# Patient Record
Sex: MALE | Race: WHITE | NOT HISPANIC OR LATINO | URBAN - METROPOLITAN AREA
[De-identification: names, ages, dates, MRNs, and addresses within clinical notes are randomized per-mention and may not be internally consistent; named-entity substitution may affect disease eponyms.]

---

## 2020-01-06 ENCOUNTER — INPATIENT (INPATIENT)
Facility: HOSPITAL | Age: 45
LOS: 0 days | Discharge: HOME | DRG: 866 | End: 2020-01-07
Attending: INTERNAL MEDICINE | Admitting: INTERNAL MEDICINE
Payer: COMMERCIAL

## 2020-01-06 VITALS
SYSTOLIC BLOOD PRESSURE: 145 MMHG | RESPIRATION RATE: 16 BRPM | OXYGEN SATURATION: 98 % | DIASTOLIC BLOOD PRESSURE: 86 MMHG | WEIGHT: 164.91 LBS | TEMPERATURE: 99 F | HEIGHT: 66 IN | HEART RATE: 104 BPM

## 2020-01-06 DIAGNOSIS — R07.9 CHEST PAIN, UNSPECIFIED: ICD-10-CM

## 2020-01-06 LAB
ALBUMIN SERPL ELPH-MCNC: 4.2 G/DL — SIGNIFICANT CHANGE UP (ref 3.3–5)
ALP SERPL-CCNC: 81 U/L — SIGNIFICANT CHANGE UP (ref 40–120)
ALT FLD-CCNC: 51 U/L — HIGH (ref 10–45)
ANION GAP SERPL CALC-SCNC: 14 MMOL/L — SIGNIFICANT CHANGE UP (ref 5–17)
APPEARANCE UR: CLEAR — SIGNIFICANT CHANGE UP
APTT BLD: 32.2 SEC — SIGNIFICANT CHANGE UP (ref 27.5–36.3)
AST SERPL-CCNC: 33 U/L — SIGNIFICANT CHANGE UP (ref 10–40)
BASOPHILS # BLD AUTO: 0.03 K/UL — SIGNIFICANT CHANGE UP (ref 0–0.2)
BASOPHILS NFR BLD AUTO: 0.3 % — SIGNIFICANT CHANGE UP (ref 0–2)
BILIRUB SERPL-MCNC: 0.7 MG/DL — SIGNIFICANT CHANGE UP (ref 0.2–1.2)
BILIRUB UR-MCNC: NEGATIVE — SIGNIFICANT CHANGE UP
BUN SERPL-MCNC: 13 MG/DL — SIGNIFICANT CHANGE UP (ref 7–23)
CALCIUM SERPL-MCNC: 8.6 MG/DL — SIGNIFICANT CHANGE UP (ref 8.4–10.5)
CHLORIDE SERPL-SCNC: 101 MMOL/L — SIGNIFICANT CHANGE UP (ref 96–108)
CK MB BLD-MCNC: 3.9 % — HIGH (ref 0–3.5)
CK MB BLD-MCNC: 7.8 % — HIGH (ref 0–3.5)
CK MB CFR SERPL CALC: 13.5 NG/ML — HIGH (ref 0–6.7)
CK MB CFR SERPL CALC: 4 NG/ML — SIGNIFICANT CHANGE UP (ref 0–6.7)
CK SERPL-CCNC: 103 U/L — SIGNIFICANT CHANGE UP (ref 30–200)
CK SERPL-CCNC: 174 U/L — SIGNIFICANT CHANGE UP (ref 30–200)
CO2 SERPL-SCNC: 22 MMOL/L — SIGNIFICANT CHANGE UP (ref 22–31)
COLOR SPEC: YELLOW — SIGNIFICANT CHANGE UP
CREAT SERPL-MCNC: 1.14 MG/DL — SIGNIFICANT CHANGE UP (ref 0.5–1.3)
DIFF PNL FLD: NEGATIVE — SIGNIFICANT CHANGE UP
EOSINOPHIL # BLD AUTO: 0.05 K/UL — SIGNIFICANT CHANGE UP (ref 0–0.5)
EOSINOPHIL NFR BLD AUTO: 0.4 % — SIGNIFICANT CHANGE UP (ref 0–6)
ERYTHROCYTE [SEDIMENTATION RATE] IN BLOOD: 33 MM/HR — HIGH (ref 0–15)
GLUCOSE SERPL-MCNC: 112 MG/DL — HIGH (ref 70–99)
GLUCOSE UR QL: NEGATIVE — SIGNIFICANT CHANGE UP
HBA1C BLD-MCNC: 5.2 % — SIGNIFICANT CHANGE UP (ref 4–5.6)
HCT VFR BLD CALC: 45.6 % — SIGNIFICANT CHANGE UP (ref 39–50)
HGB BLD-MCNC: 15.3 G/DL — SIGNIFICANT CHANGE UP (ref 13–17)
IMM GRANULOCYTES NFR BLD AUTO: 0.4 % — SIGNIFICANT CHANGE UP (ref 0–1.5)
INR BLD: 1.18 RATIO — HIGH (ref 0.88–1.16)
KETONES UR-MCNC: NEGATIVE — SIGNIFICANT CHANGE UP
LACTATE BLDV-MCNC: 1.2 MMOL/L — SIGNIFICANT CHANGE UP (ref 0.7–2)
LEUKOCYTE ESTERASE UR-ACNC: NEGATIVE — SIGNIFICANT CHANGE UP
LYMPHOCYTES # BLD AUTO: 1.8 K/UL — SIGNIFICANT CHANGE UP (ref 1–3.3)
LYMPHOCYTES # BLD AUTO: 15.9 % — SIGNIFICANT CHANGE UP (ref 13–44)
MCHC RBC-ENTMCNC: 31.6 PG — SIGNIFICANT CHANGE UP (ref 27–34)
MCHC RBC-ENTMCNC: 33.6 GM/DL — SIGNIFICANT CHANGE UP (ref 32–36)
MCV RBC AUTO: 94.2 FL — SIGNIFICANT CHANGE UP (ref 80–100)
MONOCYTES # BLD AUTO: 1.39 K/UL — HIGH (ref 0–0.9)
MONOCYTES NFR BLD AUTO: 12.2 % — SIGNIFICANT CHANGE UP (ref 2–14)
NEUTROPHILS # BLD AUTO: 8.03 K/UL — HIGH (ref 1.8–7.4)
NEUTROPHILS NFR BLD AUTO: 70.8 % — SIGNIFICANT CHANGE UP (ref 43–77)
NITRITE UR-MCNC: NEGATIVE — SIGNIFICANT CHANGE UP
NRBC # BLD: 0 /100 WBCS — SIGNIFICANT CHANGE UP (ref 0–0)
PH UR: 7 — SIGNIFICANT CHANGE UP (ref 5–8)
PLATELET # BLD AUTO: 172 K/UL — SIGNIFICANT CHANGE UP (ref 150–400)
POTASSIUM SERPL-MCNC: 3.9 MMOL/L — SIGNIFICANT CHANGE UP (ref 3.5–5.3)
POTASSIUM SERPL-SCNC: 3.9 MMOL/L — SIGNIFICANT CHANGE UP (ref 3.5–5.3)
PROT SERPL-MCNC: 6.8 G/DL — SIGNIFICANT CHANGE UP (ref 6–8.3)
PROT UR-MCNC: ABNORMAL
PROTHROM AB SERPL-ACNC: 13.5 SEC — HIGH (ref 10–12.9)
RAPID RVP RESULT: SIGNIFICANT CHANGE UP
RBC # BLD: 4.84 M/UL — SIGNIFICANT CHANGE UP (ref 4.2–5.8)
RBC # FLD: 11.6 % — SIGNIFICANT CHANGE UP (ref 10.3–14.5)
SODIUM SERPL-SCNC: 137 MMOL/L — SIGNIFICANT CHANGE UP (ref 135–145)
SP GR SPEC: 1.03 — HIGH (ref 1.01–1.02)
TROPONIN T, HIGH SENSITIVITY RESULT: 218 NG/L — HIGH (ref 0–51)
TROPONIN T, HIGH SENSITIVITY RESULT: 26 NG/L — SIGNIFICANT CHANGE UP (ref 0–51)
TROPONIN T, HIGH SENSITIVITY RESULT: 37 NG/L — SIGNIFICANT CHANGE UP (ref 0–51)
TROPONIN T, HIGH SENSITIVITY RESULT: 64 NG/L — HIGH (ref 0–51)
TSH SERPL-MCNC: 2.79 UIU/ML — SIGNIFICANT CHANGE UP (ref 0.27–4.2)
UROBILINOGEN FLD QL: ABNORMAL
WBC # BLD: 11.35 K/UL — HIGH (ref 3.8–10.5)
WBC # FLD AUTO: 11.35 K/UL — HIGH (ref 3.8–10.5)

## 2020-01-06 PROCEDURE — 99285 EMERGENCY DEPT VISIT HI MDM: CPT

## 2020-01-06 PROCEDURE — 71046 X-RAY EXAM CHEST 2 VIEWS: CPT | Mod: 26

## 2020-01-06 PROCEDURE — 93010 ELECTROCARDIOGRAM REPORT: CPT

## 2020-01-06 PROCEDURE — 93306 TTE W/DOPPLER COMPLETE: CPT | Mod: 26

## 2020-01-06 PROCEDURE — 99221 1ST HOSP IP/OBS SF/LOW 40: CPT

## 2020-01-06 RX ORDER — ASPIRIN/CALCIUM CARB/MAGNESIUM 324 MG
324 TABLET ORAL ONCE
Refills: 0 | Status: COMPLETED | OUTPATIENT
Start: 2020-01-06 | End: 2020-01-06

## 2020-01-06 RX ORDER — SODIUM CHLORIDE 9 MG/ML
500 INJECTION, SOLUTION INTRAVENOUS ONCE
Refills: 0 | Status: COMPLETED | OUTPATIENT
Start: 2020-01-06 | End: 2020-01-06

## 2020-01-06 RX ORDER — TICAGRELOR 90 MG/1
180 TABLET ORAL ONCE
Refills: 0 | Status: COMPLETED | OUTPATIENT
Start: 2020-01-06 | End: 2020-01-06

## 2020-01-06 RX ORDER — HEPARIN SODIUM 5000 [USP'U]/ML
5000 INJECTION INTRAVENOUS; SUBCUTANEOUS EVERY 12 HOURS
Refills: 0 | Status: DISCONTINUED | OUTPATIENT
Start: 2020-01-06 | End: 2020-01-06

## 2020-01-06 RX ORDER — SODIUM CHLORIDE 9 MG/ML
1000 INJECTION INTRAMUSCULAR; INTRAVENOUS; SUBCUTANEOUS
Refills: 0 | Status: DISCONTINUED | OUTPATIENT
Start: 2020-01-06 | End: 2020-01-07

## 2020-01-06 RX ORDER — ASPIRIN/CALCIUM CARB/MAGNESIUM 324 MG
81 TABLET ORAL DAILY
Refills: 0 | Status: DISCONTINUED | OUTPATIENT
Start: 2020-01-06 | End: 2020-01-07

## 2020-01-06 RX ORDER — HEPARIN SODIUM 5000 [USP'U]/ML
4400 INJECTION INTRAVENOUS; SUBCUTANEOUS EVERY 6 HOURS
Refills: 0 | Status: DISCONTINUED | OUTPATIENT
Start: 2020-01-06 | End: 2020-01-07

## 2020-01-06 RX ORDER — ACETAMINOPHEN 500 MG
650 TABLET ORAL ONCE
Refills: 0 | Status: COMPLETED | OUTPATIENT
Start: 2020-01-06 | End: 2020-01-06

## 2020-01-06 RX ORDER — HEPARIN SODIUM 5000 [USP'U]/ML
INJECTION INTRAVENOUS; SUBCUTANEOUS
Qty: 25000 | Refills: 0 | Status: DISCONTINUED | OUTPATIENT
Start: 2020-01-06 | End: 2020-01-07

## 2020-01-06 RX ORDER — TICAGRELOR 90 MG/1
90 TABLET ORAL EVERY 12 HOURS
Refills: 0 | Status: DISCONTINUED | OUTPATIENT
Start: 2020-01-07 | End: 2020-01-07

## 2020-01-06 RX ORDER — IBUPROFEN 200 MG
400 TABLET ORAL ONCE
Refills: 0 | Status: COMPLETED | OUTPATIENT
Start: 2020-01-06 | End: 2020-01-06

## 2020-01-06 RX ADMIN — SODIUM CHLORIDE 75 MILLILITER(S): 9 INJECTION INTRAMUSCULAR; INTRAVENOUS; SUBCUTANEOUS at 20:35

## 2020-01-06 RX ADMIN — Medication 400 MILLIGRAM(S): at 02:14

## 2020-01-06 RX ADMIN — Medication 650 MILLIGRAM(S): at 02:14

## 2020-01-06 RX ADMIN — SODIUM CHLORIDE 500 MILLILITER(S): 9 INJECTION, SOLUTION INTRAVENOUS at 02:44

## 2020-01-06 RX ADMIN — Medication 324 MILLIGRAM(S): at 06:52

## 2020-01-06 RX ADMIN — SODIUM CHLORIDE 500 MILLILITER(S): 9 INJECTION, SOLUTION INTRAVENOUS at 03:36

## 2020-01-06 RX ADMIN — Medication 650 MILLIGRAM(S): at 21:00

## 2020-01-06 RX ADMIN — HEPARIN SODIUM 5000 UNIT(S): 5000 INJECTION INTRAVENOUS; SUBCUTANEOUS at 20:36

## 2020-01-06 RX ADMIN — Medication 400 MILLIGRAM(S): at 03:36

## 2020-01-06 RX ADMIN — Medication 650 MILLIGRAM(S): at 20:35

## 2020-01-06 RX ADMIN — SODIUM CHLORIDE 75 MILLILITER(S): 9 INJECTION INTRAMUSCULAR; INTRAVENOUS; SUBCUTANEOUS at 16:00

## 2020-01-06 RX ADMIN — TICAGRELOR 180 MILLIGRAM(S): 90 TABLET ORAL at 22:34

## 2020-01-06 RX ADMIN — Medication 650 MILLIGRAM(S): at 03:36

## 2020-01-06 RX ADMIN — HEPARIN SODIUM 900 UNIT(S)/HR: 5000 INJECTION INTRAVENOUS; SUBCUTANEOUS at 22:36

## 2020-01-06 RX ADMIN — Medication 81 MILLIGRAM(S): at 18:00

## 2020-01-06 NOTE — ED PROVIDER NOTE - CLINICAL SUMMARY MEDICAL DECISION MAKING FREE TEXT BOX
44M no PMH p/w chest pain. Will r/o ACS with trops, however based on history may be pericarditis vs PNA.

## 2020-01-06 NOTE — ED ADULT NURSE NOTE - OBJECTIVE STATEMENT
Pt is 44 Y A&O x3 M with no PMH presenting to ED with c/o constant "dull" midsternal chest pain associated with Pt is 44 Y A&O x3 M with no PMH presenting to ED with c/o constant "dull" midsternal chest pain radiating to back associated with SOB worse upon inspiration and laying down since yesterday afternoon. Pt reports N/V/D x1 yesterday. Pt denies N/V/D at this time, fevers, chills, cough, nasal congestion, burning upon urination. Pt arrives to ED breathing unlabored on RA. Skin is warm and dry. No diaphoresis noted. No edema to LE b/l noted. Pt febrile orally. Pt tachycardic to 102. Cardiac monitor in place. Safety and comfort maintained.

## 2020-01-06 NOTE — ED PROVIDER NOTE - ATTENDING CONTRIBUTION TO CARE
44M w/ no reported PMHx with moderate-severe midsternal chest pain, non-radiating, positional, exacerbated by lying back, alleviated by leaning forward, states it started yesterday, has not taken anything for it at home. Worsening. Reports + sick contacts, kids at home sick. Reports 1 episode of vomiting non-bloody, non-bilious, and one episode of non-bloody diarrhea yesterday. Denies any prior similar chest pain. Reports family history of diabetes and father who  of MI.    PHYSICAL EXAMINATION:  VITALS REVIEWED.  VS Febrile, tachycardic, slightly tachypneic @ 22, otherwise normal  GENERALIZED APPEARANCE:  Comfortable, mild acute distress, ambulating without difficulty  SKIN:  Warm, dry, no cyanosis  HEAD:  No obvious scalp lesions  EYES:  Conjunctiva pink, no icterus  ENMT:  Mucus membranes moist, no stridor  NECK:  Supple, non-tender  CHEST AND RESPIRATORY:  Clear to auscultation B/L, good air entry B/L, equal chest expansion  HEART AND CARDIOVASCULAR:  Regular rate, no obvious murmur  ABDOMEN AND GI:  Soft, non-tender, non-distended.  No rebound, no guarding, no CVA-area tenderness  EXTREMITIES:  No deformity, edema, or calf tenderness  NEURO: AAOx3, gross motor and sensory intact  PSYCH: Normal affect     Impression:  Chest pain, r/o ACS, r/o PNA, ?ANGELIA, ?pericarditis, r/o myocarditis, r/o electrolyte abnormalities  Labs, imaging, troponins, pain management, anti-pyretics, re-eval 44M w/ no reported PMHx with moderate-severe midsternal chest pain, non-radiating, positional, exacerbated by lying back, alleviated by leaning forward, states it started yesterday, has not taken anything for it at home. Worsening. Reports + sick contacts, kids at home sick. Reports 1 episode of vomiting non-bloody, non-bilious, and one episode of non-bloody diarrhea yesterday. Denies any prior similar chest pain. Reports family history of diabetes and father who  of MI.    PHYSICAL EXAMINATION:  VITALS REVIEWED.  VS Febrile, tachycardic, slightly tachypneic @ 22, otherwise normal  GENERALIZED APPEARANCE:  Comfortable, mild acute distress, ambulating without difficulty  SKIN:  Warm, dry, no cyanosis  HEAD:  No obvious scalp lesions  EYES:  Conjunctiva pink, no icterus  ENMT:  Mucus membranes moist, no stridor  NECK:  Supple, non-tender  CHEST AND RESPIRATORY:  Clear to auscultation B/L, good air entry B/L, equal chest expansion  HEART AND CARDIOVASCULAR:  Regular rate, no obvious murmur  ABDOMEN AND GI:  Soft, non-tender, non-distended.  No rebound, no guarding, no CVA-area tenderness  EXTREMITIES:  No deformity, edema, or calf tenderness  NEURO: AAOx3, gross motor and sensory intact  PSYCH: Normal affect     Impression:  Chest pain, r/o ACS, r/o PNA, ?ANGELIA, ?pericarditis, r/o myocarditis, r/o electrolyte abnormalities  Labs, imaging, troponins, pain management, anti-pyretics, re-eval; patient is SIRS criteria but would not consider sepsis given likely viral infection vs. other cardiac etiology, doubt bacterial infection, will avoid antibiotics and unnecessary fluid administration.

## 2020-01-06 NOTE — PROGRESS NOTE ADULT - SUBJECTIVE AND OBJECTIVE BOX
called by cssu team   patient with repeat HS trop increased to 218 from 64  remains completely asymptomatic without chest pain, dyspnea, chf  echo with nl lv function, no pericardial effusion of segmental wall motion abnl   chest pain atypical, likely related to viral prodrome/viral illness with fever, n/v, pleurtic cp, c/c rene/myocarditis  cont to monitor  trend trop to peak   in light of significantly elevated trop with sig delta, will plan for ischemic eval and definitive coronary assessment with CT coronaries moris  cont asa  d/w cssu team

## 2020-01-06 NOTE — ED PROVIDER NOTE - OBJECTIVE STATEMENT
44M no PMH p/w chest pain. 10/10, constricting pain, alleviated with leaning forward. Started Sunday afternoon and worsened throughout the day. associated with shortness of breath. +fevers. Yesterday morning pt reports he felt very sick and had one episode of nausea/vomiting/diarrhea

## 2020-01-06 NOTE — H&P ADULT - NSHPLABSRESULTS_GEN_ALL_CORE
15.3   11.35 )-----------( 172      ( 2020 02:19 )             45.6       01-06    137  |  101  |  13  ----------------------------<  112<H>  3.9   |  22  |  1.14    Ca    8.6      2020 02:19    TPro  6.8  /  Alb  4.2  /  TBili  0.7  /  DBili  x   /  AST  33  /  ALT  51<H>  /  AlkPhos  81  01-06              Urinalysis Basic - ( 2020 11:34 )    Color: Yellow / Appearance: Clear / S.031 / pH: x  Gluc: x / Ketone: Negative  / Bili: Negative / Urobili: 4 mg/dL   Blood: x / Protein: Trace / Nitrite: Negative   Leuk Esterase: Negative / RBC: 10 /hpf / WBC 0 /HPF   Sq Epi: x / Non Sq Epi: 0 /hpf / Bacteria: Negative        PT/INR - ( 2020 02:19 )   PT: 13.5 sec;   INR: 1.18 ratio         PTT - ( 2020 02:19 )  PTT:32.2 sec    Lactate Trend      CARDIAC MARKERS ( 2020 11:31 )  x     / x     / 103 U/L / x     / 4.0 ng/mL        CAPILLARY BLOOD GLUCOSE

## 2020-01-06 NOTE — CONSULT NOTE ADULT - SUBJECTIVE AND OBJECTIVE BOX
CARDIOLOGY CONSULT - Dr. Cisse         HPI:  44M no PMH p/w chest pain.  He reports on saturday he had n/V/ diarrhea. On sunday he experience 10/10,  mid chest pain, worse when laying flat, alleviated with leaning forward. Started Sunday afternoon and worsened throughout the day and associated with shortness of breath. +fevers. he reports son recently sick with pna. prior to this episode denies any exertional chest pain, palps, dizziness or orthopnea. + active. Denies hx of MI, cad, chf or arrhtymias. Never had cardiac eval.   Symptoms now resolved after taking tylenol and ibuprofen  in nad   ros otherwise negative       PAST MEDICAL & SURGICAL HISTORY:  No pertinent past medical history  No significant past surgical history          PREVIOUS DIAGNOSTIC TESTING:    [ ] Echocardiogram:  [ ]  Catheterization:  [ ] Stress Test:  	    MEDICATIONS:  Home Medications:      MEDICATIONS  (STANDING):  aspirin enteric coated 81 milliGRAM(s) Oral daily  heparin  Injectable 5000 Unit(s) SubCutaneous every 12 hours  sodium chloride 0.9%. 1000 milliLiter(s) (75 mL/Hr) IV Continuous <Continuous>      FAMILY HISTORY:  Father: MI at  68yr old    SOCIAL HISTORY:    [x ] Non-smoker  [ ] Smoker  [ ] Alcohol: denies     Allergies    No Known Allergies    Intolerances    	    REVIEW OF SYSTEMS:  CONSTITUTIONAL: No fever, weight loss, or fatigue  EYES: No eye pain, visual disturbances, or discharge  ENMT:  No difficulty hearing, tinnitus, vertigo; No sinus or throat pain  NECK: No pain or stiffness  RESPIRATORY: see hpi   CARDIOVASCULAR: No chest pain, palpitations, passing out, dizziness, or leg swelling  GASTROINTESTINAL: No abdominal or epigastric pain. NO hematemesis; No melena or hematochezia. +  nausea, vomiting, diarrhea   GENITOURINARY: No dysuria, frequency, hematuria, or incontinence  NEUROLOGICAL: No headaches, memory loss, loss of strength, numbness, or tremors  SKIN: No itching, burning, rashes, or lesions   	    [x ] All others negative	  [ ] Unable to obtain    PHYSICAL EXAM:  T(C): 36.7 (01-06-20 @ 07:30), Max: 38.9 (01-06-20 @ 02:01)  HR: 80 (01-06-20 @ 07:30) (80 - 104)  BP: 112/77 (01-06-20 @ 07:30) (103/69 - 145/86)  RR: 18 (01-06-20 @ 07:30) (16 - 22)  SpO2: 98% (01-06-20 @ 07:30) (95% - 99%)  Wt(kg): --  I&O's Summary      Appearance: Normal	  Psychiatry: A & O x 3, Mood & affect appropriate  HEENT:   Normal oral mucosa, PERRL, EOMI	  Lymphatic: No lymphadenopathy  Cardiovascular: Normal S1 S2,RRR, No JVD, No murmurs  Respiratory: Lungs clear to auscultation	  Gastrointestinal:  Soft, Non-tender, + BS	  Skin: No rashes, No ecchymoses, No cyanosis	  Neurologic: Non-focal  Extremities: Normal range of motion, No clubbing, cyanosis or edema  Vascular: Peripheral pulses palpable 2+ bilaterally    TELEMETRY: 	    ECG:  nsr HR 99 bpm   q wave lead III  RADIOLOGY:  < from: Xray Chest 2 Views PA/Lat (01.06.20 @ 02:37) >  FINDINGS:   The heart size is normal.   The lungs are clear. There are no pleural effusions or pneumothorax.  The bones are unremarkable.    IMPRESSION:   Clear lungs.        < end of copied text >    OTHER: 	  	  LABS:	 	    CARDIAC MARKERS:  Troponin T, High Sensitivity Result: 37 ng/L (01-06 @ 04:27)  Troponin T, High Sensitivity Result: 26 ng/L (01-06 @ 02:19)                                  15.3   11.35 )-----------( 172      ( 06 Jan 2020 02:19 )             45.6     01-06    137  |  101  |  13  ----------------------------<  112<H>  3.9   |  22  |  1.14    Ca    8.6      06 Jan 2020 02:19    TPro  6.8  /  Alb  4.2  /  TBili  0.7  /  DBili  x   /  AST  33  /  ALT  51<H>  /  AlkPhos  81  01-06    PT/INR - ( 06 Jan 2020 02:19 )   PT: 13.5 sec;   INR: 1.18 ratio         PTT - ( 06 Jan 2020 02:19 )  PTT:32.2 sec  proBNP:   Lipid Profile:   HgA1c:   TSH:

## 2020-01-06 NOTE — ED ADULT NURSE REASSESSMENT NOTE - NS ED NURSE REASSESS COMMENT FT1
pt remains stable in ED, A&Ox3, breathing spontaneous, unlabored w/o distress on room air, NAD, denies chest pain or SOB, CM NSR, awaits ECHO and bed
Pt received from MAURICE Mason in [place], alert and oriented times three, breathing spontaneous, unlabored without distress on room air, NAD, CM NSR, denies chest pain, SOB currently, VSS, admitted to tele for chest pain, awaits bed

## 2020-01-06 NOTE — CHART NOTE - NSCHARTNOTEFT_GEN_A_CORE
Danica Murray  PGY-3  [phone]  [facility]  Dept. Internal Medicine    TO BE COMPLETED WITHIN 6 HOURS OF INITIAL ASSESSMENT:    For use in patients that have 2 sepsis criteria and new organ dysfunction   •	New or increased oxygen requirement  •	Creatinine >2mg/dL  •	Bilirubin>2mg/dL  •	Platelet <100,00/mm3  •	INR >1.5, PTT>60  •	Lactate >2    If patient persistent hypotension (SBP<90) or any lactate >4 then provider evaluation (Physician/PA/NP) within 30 minutes of bolus completion is required.    Vital Signs Last 24 Hrs  T(C): 36.7 (06 Jan 2020 07:30), Max: 38.9 (06 Jan 2020 02:01)  T(F): 98.1 (06 Jan 2020 07:30), Max: 102 (06 Jan 2020 02:01)  HR: 80 (06 Jan 2020 07:30) (80 - 104)  BP: 112/77 (06 Jan 2020 07:30) (103/69 - 145/86)  BP(mean): --  RR: 18 (06 Jan 2020 07:30) (16 - 22)  SpO2: 98% (06 Jan 2020 07:30) (95% - 99%)  		  LUNGS:  [ x ]Clear bilaterally [  ] Wheeze [  ] Rhonchi [  ] Rales [  ] Crackles; Other:  HEART: [ x ]RRR [  ] No murmur[  ]  Normal S1S2[  ] Tachycardia;  Other:  CAPILLARY REFiLL:  	Fingers: [ x ] less than 2 seconds [  ] more than 2 seconds                                           Toes: [ x ]  less than 2 seconds [  ] more than 2 seconds   PERIPHERAL PULSES:  Radial: [  ] Palpable  [  ]  non-palpable                                         Dorsalis Pedis: [x  ] Palpable  [  ] non-palpable                                         Posterior Tibial: [  ] Palpable  [  ] non-palpable                                          Other:  SKIN:   [  ]  Diaphoretic  [  ]  mottling  [  ]  Cold extremities  [  ]  Warm [x  ]  Dry                      Other:    BEDSIDE ULTRASOUND FINDINGS (IF APPLICABLE):    Labs:  06 Jan 2020 02:19    137    |  101    |  13     ----------------------------<  112    3.9     |  22     |  1.14     Ca    8.6        06 Jan 2020 02:19    TPro  6.8    /  Alb  4.2    /  TBili  0.7    /  DBili  x      /  AST  33     /  ALT  51     /  AlkPhos  81     06 Jan 2020 02:19                          15.3   11.35 )-----------( 172      ( 06 Jan 2020 02:19 )             45.6     PT/INR - ( 06 Jan 2020 02:19 )   PT: 13.5 sec;   INR: 1.18 ratio         PTT - ( 06 Jan 2020 02:19 )  PTT:32.2 sec  Lactate:    Plan (orders must be placed in EMR):     [  ]  Check Repeat Lactate   [  ]  No change in current plan  [  ]  Start Vasopressors:  [ x ]  Repeat Fluid Bolus:  [x  ] other: Check lactate     Care Discussed with Consultants/Other Providers [ ] YES  [ ] NO

## 2020-01-06 NOTE — ED PROVIDER NOTE - PHYSICAL EXAMINATION
General: Well developed, well nourished  HEENT: Normocephalic and atraumatic, Trachea midline.   Cardiac: Normal S1 and S2 w/ RRR. No MRG.  Pulmonary: CTA bilaterally. No increased WOB.   Abdominal: Soft, NTND  Neurologic: No focal sensory or motor deficits.  Musculoskeletal: No limited ROM.  Vascular: Warm and well perfused  Skin: Color appropriate for race.   Psychiatric: Appropriate mood and affect. No apparent risk to self or others.  Chuy Page M.D. PGY-2

## 2020-01-06 NOTE — CONSULT NOTE ADULT - ATTENDING COMMENTS
Patient seen and examined.  Agree with above NP note.  patient presented with chest pain in setting of fever, n/v  symptoms atypical, pleuritic, now resolved post nsaids  ecg without acute ischemic abnl   trop negative but + delta  no chf  remaining labs normal besides borderline wbc  f/u 3rd ce  await echo to r/o segmental wall motion abnl, effusion  sx likely c/w pericarditis sec to acute viral illness  r/o myocarditis   med/id f/u  if echo normal and remains asx can d/c later today from cv standpoint with nsaids and outpt f/u

## 2020-01-06 NOTE — H&P ADULT - ASSESSMENT
pt w/ [facility]/ and ? viral process  id/ cards eval noted  echo  r/o pericarditis / myocarditis  f/u trops  dvt proph  discussed w/ pt/ wife/ cards

## 2020-01-06 NOTE — CONSULT NOTE ADULT - ASSESSMENT
44M no PMH p/w chest pain.      1. Atypical chest pain  pleuritic, ? musculoskeletal from recent n/v vs pericarditis   chest xray negative of chf or pna   trops indeterminate, no evidence of acs   rvp negative   symptoms resolved s/p tylenol and ibuprofen  check echo to eval Lv fx, r/o pericarditis   check esr, ck / ckmb   asa     2. Fever   id eval noted     dvt ppx

## 2020-01-06 NOTE — ED PROVIDER NOTE - NS ED ROS FT
REVIEW OF SYSTEMS:  General:  no fever, no chills  HEENT: no headache, no vision changes  Cardiac: +chest pain, no palpitations  Respiratory: no cough, +shortness of breath  Gastrointestinal: no abdominal pain, +nausea, +vomiting, +diarrhea  Genitourinary: no hematuria, no dysuria, no urinary frequency  Extremities: no extremity swelling, no extremity pain  Neuro: no focal weakness, no numbness/tingling of the extremities, no decreased sensation  Heme: no easy bleeding, no easy bruising  Skin: no jaundice,  no rashes, no lesions  All other ROS as documented in HPI  -Chuy Page, PGY-2

## 2020-01-06 NOTE — ED PROVIDER NOTE - PROGRESS NOTE DETAILS
Patient reports still some chest pain, non-radiating, but it has mostly improved; explained +HsnTrop, patient states that he would prefer to be admitted to have further cardiac work up. Will admit given +Troponin although do not think he requires emergent anticoagulation at this time. Will give ASA.

## 2020-01-06 NOTE — PROGRESS NOTE ADULT - SUBJECTIVE AND OBJECTIVE BOX
Patient is a 44y old  Male who presents with a chief complaint of   HPI: admitted with CP  history of fever.    No travel  No sick contacts  no earline ab         PAST MEDICAL & SURGICAL HISTORY:  No pertinent past medical history  No significant past surgical history      Social history:    FAMILY HISTORY:    REVIEW OF SYSTEMS  General:	 . Fevers     Skin:No rash  	  Ophthalmologic:Denies any visual complaints,discharge redness or photophobia  	  ENMT:No nasal discharge,headache,sinus congestion or throat pain.No dental complaints    Respiratory and Thorax:No cough,sputum or chest pain.Denies shortness of breath  	  Cardiovascular:  chest pain,     Gastrointestinal:	NO nausea,abdominal pain or diarrhea.    Genitourinary:	No dysuria,frequency. No flank pain    Musculoskeletal:	No joint swelling or pain.No weakness    Neurological:No confusion,diziness.No extremity weakness.No bladder or bowel incontinence	    Psychiatric:No delusions or hallucinations	    Hematology/Lymphatics:	No LN swelling.No gum bleeding          Allergic/Immunologic:	No hives or rash   Allergies    No Known Allergies    Intolerances        Antimicrobials:          Vital Signs Last 24 Hrs  T(C): 36.7 (06 Jan 2020 07:30), Max: 38.9 (06 Jan 2020 02:01)  T(F): 98.1 (06 Jan 2020 07:30), Max: 102 (06 Jan 2020 02:01)  HR: 80 (06 Jan 2020 07:30) (80 - 104)  BP: 112/77 (06 Jan 2020 07:30) (103/69 - 145/86)  BP(mean): --  RR: 18 (06 Jan 2020 07:30) (16 - 22)  SpO2: 98% (06 Jan 2020 07:30) (95% - 99%)    PHYSICAL EXAM:Pleasant patient in no acute distress.         No cachexia     Eyes:PERRL EOMI.NO discharge or conjunctival injection    ENMT:No sinus tenderness.No thrush.No pharyngeal exudate or erythema.Fair dental hygiene    Neck:Supple,No LN,no JVD      Respiratory:Good air entry bilaterally,CTA    Cardiovascular:S1 S2 wnl, No murmurs,rub or gallops    Gastrointestinal:Soft BS(+) no tenderness no masses ,No rebound or guarding    Genitourinary:No CVA tendereness     Rectal:    Extremities:No cyanosis,clubbing or edema.    Vascular:peripheral pulses felt    Neurological:AAO X 3,No grossly focal deficits    Skin:No rash     Lymph Nodes:No palpable LNs    Musculoskeletal:No joint swelling or LOM                                     15.3   11.35 )-----------( 172      ( 06 Jan 2020 02:19 )             45.6         01-06    137  |  101  |  13  ----------------------------<  112<H>  3.9   |  22  |  1.14    Ca    8.6      06 Jan 2020 02:19    TPro  6.8  /  Alb  4.2  /  TBili  0.7  /  DBili  x   /  AST  33  /  ALT  51<H>  /  AlkPhos  81  01-06      RECENT CULTURES:      MICROBIOLOGY:          Radiology:      Assessment:        Recommendations and Plan:    Pager [phone]  After 5 pm/weekends or if no response :[phone]

## 2020-01-06 NOTE — H&P ADULT - HISTORY OF PRESENT ILLNESS
43 y/o Male w/ no PMH p/w chest pain.    He states on saturday he had n/V/ diarrhea  . On sunday he experience   mid chest pain, worse when laying flat, alleviated with leaning forward.   Started Sunday afternoon and worsened throughout the day and associated with shortness of breath. +fevers. he reports son recently sick with pna.     Symptoms now resolved after taking tylenol and ibuprofen  in nad   ros otherwise negative

## 2020-01-07 ENCOUNTER — TRANSCRIPTION ENCOUNTER (OUTPATIENT)
Age: 45
End: 2020-01-07

## 2020-01-07 VITALS
OXYGEN SATURATION: 99 % | RESPIRATION RATE: 17 BRPM | DIASTOLIC BLOOD PRESSURE: 90 MMHG | HEART RATE: 82 BPM | SYSTOLIC BLOOD PRESSURE: 126 MMHG

## 2020-01-07 LAB
ANION GAP SERPL CALC-SCNC: 11 MMOL/L — SIGNIFICANT CHANGE UP (ref 5–17)
APTT BLD: 47.4 SEC — HIGH (ref 27.5–36.3)
APTT BLD: 57.5 SEC — HIGH (ref 27.5–36.3)
BUN SERPL-MCNC: 12 MG/DL — SIGNIFICANT CHANGE UP (ref 7–23)
CALCIUM SERPL-MCNC: 8.5 MG/DL — SIGNIFICANT CHANGE UP (ref 8.4–10.5)
CHLORIDE SERPL-SCNC: 103 MMOL/L — SIGNIFICANT CHANGE UP (ref 96–108)
CK MB BLD-MCNC: 6.4 % — HIGH (ref 0–3.5)
CK MB CFR SERPL CALC: 11.4 NG/ML — HIGH (ref 0–6.7)
CK SERPL-CCNC: 178 U/L — SIGNIFICANT CHANGE UP (ref 30–200)
CO2 SERPL-SCNC: 24 MMOL/L — SIGNIFICANT CHANGE UP (ref 22–31)
CREAT SERPL-MCNC: 1.08 MG/DL — SIGNIFICANT CHANGE UP (ref 0.5–1.3)
GLUCOSE SERPL-MCNC: 106 MG/DL — HIGH (ref 70–99)
HCT VFR BLD CALC: 40.8 % — SIGNIFICANT CHANGE UP (ref 39–50)
HCT VFR BLD CALC: 43.1 % — SIGNIFICANT CHANGE UP (ref 39–50)
HGB BLD-MCNC: 13.7 G/DL — SIGNIFICANT CHANGE UP (ref 13–17)
HGB BLD-MCNC: 14.4 G/DL — SIGNIFICANT CHANGE UP (ref 13–17)
MCHC RBC-ENTMCNC: 31.8 PG — SIGNIFICANT CHANGE UP (ref 27–34)
MCHC RBC-ENTMCNC: 31.9 PG — SIGNIFICANT CHANGE UP (ref 27–34)
MCHC RBC-ENTMCNC: 33.4 GM/DL — SIGNIFICANT CHANGE UP (ref 32–36)
MCHC RBC-ENTMCNC: 33.6 GM/DL — SIGNIFICANT CHANGE UP (ref 32–36)
MCV RBC AUTO: 94.7 FL — SIGNIFICANT CHANGE UP (ref 80–100)
MCV RBC AUTO: 95.6 FL — SIGNIFICANT CHANGE UP (ref 80–100)
NRBC # BLD: 0 /100 WBCS — SIGNIFICANT CHANGE UP (ref 0–0)
NRBC # BLD: 0 /100 WBCS — SIGNIFICANT CHANGE UP (ref 0–0)
PLATELET # BLD AUTO: 166 K/UL — SIGNIFICANT CHANGE UP (ref 150–400)
PLATELET # BLD AUTO: 182 K/UL — SIGNIFICANT CHANGE UP (ref 150–400)
POTASSIUM SERPL-MCNC: 3.5 MMOL/L — SIGNIFICANT CHANGE UP (ref 3.5–5.3)
POTASSIUM SERPL-SCNC: 3.5 MMOL/L — SIGNIFICANT CHANGE UP (ref 3.5–5.3)
RBC # BLD: 4.31 M/UL — SIGNIFICANT CHANGE UP (ref 4.2–5.8)
RBC # BLD: 4.51 M/UL — SIGNIFICANT CHANGE UP (ref 4.2–5.8)
RBC # FLD: 11.5 % — SIGNIFICANT CHANGE UP (ref 10.3–14.5)
RBC # FLD: 11.7 % — SIGNIFICANT CHANGE UP (ref 10.3–14.5)
SODIUM SERPL-SCNC: 138 MMOL/L — SIGNIFICANT CHANGE UP (ref 135–145)
TROPONIN T, HIGH SENSITIVITY RESULT: 282 NG/L — HIGH (ref 0–51)
WBC # BLD: 8.97 K/UL — SIGNIFICANT CHANGE UP (ref 3.8–10.5)
WBC # BLD: 9.67 K/UL — SIGNIFICANT CHANGE UP (ref 3.8–10.5)
WBC # FLD AUTO: 8.97 K/UL — SIGNIFICANT CHANGE UP (ref 3.8–10.5)
WBC # FLD AUTO: 9.67 K/UL — SIGNIFICANT CHANGE UP (ref 3.8–10.5)

## 2020-01-07 PROCEDURE — 99285 EMERGENCY DEPT VISIT HI MDM: CPT | Mod: 25

## 2020-01-07 PROCEDURE — 87486 CHLMYD PNEUM DNA AMP PROBE: CPT

## 2020-01-07 PROCEDURE — 71046 X-RAY EXAM CHEST 2 VIEWS: CPT

## 2020-01-07 PROCEDURE — 80053 COMPREHEN METABOLIC PANEL: CPT

## 2020-01-07 PROCEDURE — 85610 PROTHROMBIN TIME: CPT

## 2020-01-07 PROCEDURE — 75574 CT ANGIO HRT W/3D IMAGE: CPT | Mod: 26

## 2020-01-07 PROCEDURE — 85652 RBC SED RATE AUTOMATED: CPT

## 2020-01-07 PROCEDURE — 81001 URINALYSIS AUTO W/SCOPE: CPT

## 2020-01-07 PROCEDURE — 84443 ASSAY THYROID STIM HORMONE: CPT

## 2020-01-07 PROCEDURE — 84484 ASSAY OF TROPONIN QUANT: CPT

## 2020-01-07 PROCEDURE — 80048 BASIC METABOLIC PNL TOTAL CA: CPT

## 2020-01-07 PROCEDURE — 83605 ASSAY OF LACTIC ACID: CPT

## 2020-01-07 PROCEDURE — 87633 RESP VIRUS 12-25 TARGETS: CPT

## 2020-01-07 PROCEDURE — 93306 TTE W/DOPPLER COMPLETE: CPT

## 2020-01-07 PROCEDURE — 85027 COMPLETE CBC AUTOMATED: CPT

## 2020-01-07 PROCEDURE — 96360 HYDRATION IV INFUSION INIT: CPT

## 2020-01-07 PROCEDURE — 83036 HEMOGLOBIN GLYCOSYLATED A1C: CPT

## 2020-01-07 PROCEDURE — 75574 CT ANGIO HRT W/3D IMAGE: CPT

## 2020-01-07 PROCEDURE — 93005 ELECTROCARDIOGRAM TRACING: CPT

## 2020-01-07 PROCEDURE — 87581 M.PNEUMON DNA AMP PROBE: CPT

## 2020-01-07 PROCEDURE — 82550 ASSAY OF CK (CPK): CPT

## 2020-01-07 PROCEDURE — 87798 DETECT AGENT NOS DNA AMP: CPT

## 2020-01-07 PROCEDURE — 82553 CREATINE MB FRACTION: CPT

## 2020-01-07 PROCEDURE — 85730 THROMBOPLASTIN TIME PARTIAL: CPT

## 2020-01-07 RX ORDER — METOPROLOL TARTRATE 50 MG
5 TABLET ORAL ONCE
Refills: 0 | Status: COMPLETED | OUTPATIENT
Start: 2020-01-07 | End: 2020-01-07

## 2020-01-07 RX ORDER — METOPROLOL TARTRATE 50 MG
50 TABLET ORAL ONCE
Refills: 0 | Status: COMPLETED | OUTPATIENT
Start: 2020-01-07 | End: 2020-01-07

## 2020-01-07 RX ORDER — ASPIRIN/CALCIUM CARB/MAGNESIUM 324 MG
1 TABLET ORAL
Qty: 0 | Refills: 0 | DISCHARGE
Start: 2020-01-07

## 2020-01-07 RX ORDER — TICAGRELOR 90 MG/1
1 TABLET ORAL
Qty: 0 | Refills: 0 | DISCHARGE
Start: 2020-01-07

## 2020-01-07 RX ADMIN — Medication 50 MILLIGRAM(S): at 11:45

## 2020-01-07 RX ADMIN — HEPARIN SODIUM 1050 UNIT(S)/HR: 5000 INJECTION INTRAVENOUS; SUBCUTANEOUS at 05:37

## 2020-01-07 RX ADMIN — Medication 81 MILLIGRAM(S): at 09:32

## 2020-01-07 RX ADMIN — Medication 50 MILLIGRAM(S): at 09:32

## 2020-01-07 RX ADMIN — Medication 5 MILLIGRAM(S): at 14:07

## 2020-01-07 RX ADMIN — TICAGRELOR 90 MILLIGRAM(S): 90 TABLET ORAL at 05:39

## 2020-01-07 RX ADMIN — HEPARIN SODIUM 1050 UNIT(S)/HR: 5000 INJECTION INTRAVENOUS; SUBCUTANEOUS at 12:13

## 2020-01-07 NOTE — CHART NOTE - NSCHARTNOTEFT_GEN_A_CORE
This is a 44 yr old male with no PMH who presented to the ED Sunday for chest pain. Cardiac enzymes initially negative, however, have been increasing overnight. Patient remains asymptomatic and EKG unchanged from prior. Echo results normal with EF of 60%.  Plan for CT coronaries this morning; in light of uptrending enzymes, patient started on heparin drip and given Brilinta 180mg PO bolus w/ 90mg daily to follow. Dr. Sahshi Cisse contacted and updated and is in agreement with plan. Will continue to monitor and will contact Dr. Cisse and cardiology fellow on call should patient become symptomatic or develop EKG changes.    Selena Ann, [facility]-BC  Cardiology  [phone]

## 2020-01-07 NOTE — DISCHARGE NOTE PROVIDER - NSDCFUADDINST_GEN_ALL_CORE_FT
Low salt, low fat diet.   Weight management.   No smoking.  Follow up appointments with your doctor(s)  as instructed.

## 2020-01-07 NOTE — DISCHARGE NOTE PROVIDER - NSDCMRMEDTOKEN_GEN_ALL_CORE_FT
aspirin 81 mg oral delayed release tablet: 1 tab(s) orally once a day  ticagrelor 90 mg oral tablet: 1 tab(s) orally every 12 hours

## 2020-01-07 NOTE — DISCHARGE NOTE PROVIDER - NSDCCPTREATMENT_GEN_ALL_CORE_FT
PRINCIPAL PROCEDURE  Procedure: Computerized tomography (ct scan) of multiple coronary arteries using other contrast  Findings and Treatment: Findings reviewed by Dr. Arriola, no further intervention at this time.

## 2020-01-07 NOTE — DISCHARGE NOTE PROVIDER - CARE PROVIDER_API CALL
Magda Arriola (MD)  Cardiovascular Disease; Interventional Cardiology  [address] [place] [address]  Phone: [phone]  Fax: [phone]  Follow Up Time: 2 weeks

## 2020-01-07 NOTE — DISCHARGE NOTE PROVIDER - NSDCCPCAREPLAN_GEN_ALL_CORE_FT
PRINCIPAL DISCHARGE DIAGNOSIS  Diagnosis: Chest pain  Assessment and Plan of Treatment: Low salt, low fat diet.   Weight management.   Take medications as prescribed.    No smoking.  Follow up appointments with your doctor(s)  as instructed.

## 2020-01-07 NOTE — PROGRESS NOTE ADULT - ASSESSMENT
44 year old with Chest pain, somewhat pleuritic in nature/ no travel  not sob/   denies any other symptoms   no history  of CAD but had troponin elevation   chest xray negative for pna  no rub on exam.  await stress  test and cardiology work up Hold ab    could be pericarditis but doubt bacterial infection
pt w/ [facility]/   elevated trops  echo wnls  ct coronaries this am   id/ cards eval noted  dvt proph  discussed w/ pt/ wife/ cards

## 2020-01-07 NOTE — DISCHARGE NOTE PROVIDER - HOSPITAL COURSE
HPI:    45 y/o Male w/ no PMH p/w chest pain.      He states on Saturday he had n/V/ diarrhea    . On Sunday he experience   mid chest pain, worse when laying flat, alleviated with leaning forward.     Started Sunday afternoon and worsened throughout the day and associated with shortness of breath. +fevers. he reports son recently sick with pna.         1/7/2020    Patient presented c/o chest pain with n/v/d for 3 days. Labs were drawn and noted for upward trending Troponins (peak of 282), and a high CKMB (11.4). ACS protocol was initiated, pt was loaded on Brilianta, and started on a Heparin drip. Pt received TTE -normal with a EF of 60%. Pt was sent for CT coronaries and films were reviewed by Dr. Arriola. Case was discussed with Dr. Bermudez, patient is stable for discharge with close cardiology follow up.

## 2020-01-07 NOTE — DISCHARGE NOTE NURSING/CASE MANAGEMENT/SOCIAL WORK - PATIENT PORTAL LINK FT
You can access the FollowMyHealth Patient Portal offered by [facility] by registering at the following website: [email or web address]. By joining [employer]’s FollowMyHealth portal, you will also be able to view your health information using other applications (apps) compatible with our system.

## 2020-01-07 NOTE — PROGRESS NOTE ADULT - SUBJECTIVE AND OBJECTIVE BOX
CHIEF COMPLAINT:Patient is a 44y old  Male who presents with a chief complaint of cp (06 Jan 2020 10:35)    	        PAST MEDICAL & SURGICAL HISTORY:  No pertinent past medical history  No significant past surgical history          REVIEW OF SYSTEMS:  CONSTITUTIONAL: No fever, weight loss, or fatigue  EYES: No eye pain, visual disturbances, or discharge  NECK: No pain or stiffness  RESPIRATORY: No cough, wheezing, chills or hemoptysis; No Shortness of Breath  CARDIOVASCULAR: No chest pain, palpitations, passing out, dizziness, or leg swelling  GASTROINTESTINAL: No abdominal or epigastric pain. No nausea, vomiting, or hematemesis; No diarrhea or constipation. No melena or hematochezia.  GENITOURINARY: No dysuria, frequency, hematuria, or incontinence  NEUROLOGICAL: No headaches, memory loss, loss of strength, numbness, or tremors  SKIN: No itching, burning, rashes, or lesions   LYMPH Nodes: No enlarged glands  ENDOCRINE: No heat or cold intolerance; No hair loss  MUSCULOSKELETAL: No joint pain or swelling; No muscle, back, or extremity pain    Medications:  MEDICATIONS  (STANDING):  aspirin enteric coated 81 milliGRAM(s) Oral daily  heparin  Infusion.  Unit(s)/Hr (9 mL/Hr) IV Continuous <Continuous>  sodium chloride 0.9%. 1000 milliLiter(s) (75 mL/Hr) IV Continuous <Continuous>  ticagrelor 90 milliGRAM(s) Oral every 12 hours    MEDICATIONS  (PRN):  heparin  Injectable 4400 Unit(s) IV Push every 6 hours PRN For aPTT less than 40    	    PHYSICAL EXAM:  T(C): 36.9 (01-06-20 @ 20:55), Max: 36.9 (01-06-20 @ 18:30)  HR: 85 (01-06-20 @ 20:55) (78 - 85)  BP: 125/77 (01-06-20 @ 20:55) (119/85 - 135/90)  RR: 17 (01-06-20 @ 20:55) (16 - 17)  SpO2: 99% (01-06-20 @ 20:55) (97% - 99%)  Wt(kg): --  I&O's Summary    06 Jan 2020 07:01  -  07 Jan 2020 07:00  --------------------------------------------------------  IN: 180 mL / OUT: 0 mL / NET: 180 mL        Appearance: Normal	  HEENT:   Normal oral mucosa, PERRL, EOMI	  Lymphatic: No lymphadenopathy  Cardiovascular: Normal S1 S2, No JVD, No murmurs, No edema  Respiratory: Lungs clear to auscultation	  Psychiatry: A & O x 3, Mood & affect appropriate  Gastrointestinal:  Soft, Non-tender, + BS	  Skin: No rashes, No ecchymoses, No cyanosis	  Neurologic: Non-focal  Extremities: Normal range of motion, No clubbing, cyanosis or edema  Vascular: Peripheral pulses palpable 2+ bilaterally    TELEMETRY: 	    ECG:  	  RADIOLOGY:  OTHER: 	  	  LABS:	 	    CARDIAC MARKERS:  CARDIAC MARKERS ( 07 Jan 2020 01:33 )  x     / x     / 178 U/L / x     / 11.4 ng/mL  CARDIAC MARKERS ( 06 Jan 2020 18:10 )  x     / x     / 174 U/L / x     / 13.5 ng/mL  CARDIAC MARKERS ( 06 Jan 2020 11:31 )  x     / x     / 103 U/L / x     / 4.0 ng/mL                                13.7   8.97  )-----------( 166      ( 07 Jan 2020 04:29 )             40.8     01-07    138  |  103  |  12  ----------------------------<  106<H>  3.5   |  24  |  1.08    Ca    8.5      07 Jan 2020 01:33    TPro  6.8  /  Alb  4.2  /  TBili  0.7  /  DBili  x   /  AST  33  /  ALT  51<H>  /  AlkPhos  81  01-06    proBNP:   Lipid Profile:   HgA1c: Hemoglobin A1C, Whole Blood: 5.2 % (01-06 @ 15:02)    TSH: Thyroid Stimulating Hormone, Serum: 2.79 uIU/mL (01-06 @ 16:23) CHIEF COMPLAINT:Patient is a 44y old  Male who presents with a chief complaint of cp (06 Jan 2020 10:35)    	        PAST MEDICAL & SURGICAL HISTORY:  No pertinent past medical history  No significant past surgical history          REVIEW OF SYSTEMS:  CONSTITUTIONAL: No fever, weight loss, or fatigue  EYES: No eye pain, visual disturbances, or discharge  NECK: No pain or stiffness  RESPIRATORY: No cough, wheezing, chills or hemoptysis; No Shortness of Breath  CARDIOVASCULAR: No chest pain, palpitations, passing out, dizziness, or leg swelling  GASTROINTESTINAL: No abdominal or epigastric pain. No nausea, vomiting, or hematemesis; No diarrhea or constipation. No melena or hematochezia.  GENITOURINARY: No dysuria, frequency, hematuria, or incontinence  NEUROLOGICAL: No headaches, memory loss, loss of strength, numbness, or tremors      Medications:  MEDICATIONS  (STANDING):  aspirin enteric coated 81 milliGRAM(s) Oral daily  heparin  Infusion.  Unit(s)/Hr (9 mL/Hr) IV Continuous <Continuous>  sodium chloride 0.9%. 1000 milliLiter(s) (75 mL/Hr) IV Continuous <Continuous>  ticagrelor 90 milliGRAM(s) Oral every 12 hours    MEDICATIONS  (PRN):  heparin  Injectable 4400 Unit(s) IV Push every 6 hours PRN For aPTT less than 40    	    PHYSICAL EXAM:  T(C): 36.9 (01-06-20 @ 20:55), Max: 36.9 (01-06-20 @ 18:30)  HR: 85 (01-06-20 @ 20:55) (78 - 85)  BP: 125/77 (01-06-20 @ 20:55) (119/85 - 135/90)  RR: 17 (01-06-20 @ 20:55) (16 - 17)  SpO2: 99% (01-06-20 @ 20:55) (97% - 99%)  Wt(kg): --  I&O's Summary    06 Jan 2020 07:01  -  07 Jan 2020 07:00  --------------------------------------------------------  IN: 180 mL / OUT: 0 mL / NET: 180 mL        Appearance: Normal	  HEENT:   Normal oral mucosa, PERRL, EOMI	  Lymphatic: No lymphadenopathy  Cardiovascular: Normal S1 S2, No JVD, No murmurs, No edema  Respiratory: Lungs clear to auscultation	  Psychiatry: A & O x 3, Mood & affect appropriate  Gastrointestinal:  Soft, Non-tender, + BS	  Skin: No rashes, No ecchymoses, No cyanosis	  Neurologic: Non-focal  Extremities: Normal range of motion, No clubbing, cyanosis or edema  Vascular: Peripheral pulses palpable 2+ bilaterally    TELEMETRY: 	    ECG:  	  RADIOLOGY:  OTHER: 	  	  LABS:	 	    CARDIAC MARKERS:  CARDIAC MARKERS ( 07 Jan 2020 01:33 )  x     / x     / 178 U/L / x     / 11.4 ng/mL  CARDIAC MARKERS ( 06 Jan 2020 18:10 )  x     / x     / 174 U/L / x     / 13.5 ng/mL  CARDIAC MARKERS ( 06 Jan 2020 11:31 )  x     / x     / 103 U/L / x     / 4.0 ng/mL                                13.7   8.97  )-----------( 166      ( 07 Jan 2020 04:29 )             40.8     01-07    138  |  103  |  12  ----------------------------<  106<H>  3.5   |  24  |  1.08    Ca    8.5      07 Jan 2020 01:33    TPro  6.8  /  Alb  4.2  /  TBili  0.7  /  DBili  x   /  AST  33  /  ALT  51<H>  /  AlkPhos  81  01-06    proBNP:   Lipid Profile:   HgA1c: Hemoglobin A1C, Whole Blood: 5.2 % (01-06 @ 15:02)    TSH: Thyroid Stimulating Hormone, Serum: 2.79 uIU/mL (01-06 @ 16:23)

## 2020-01-23 PROBLEM — Z00.00 ENCOUNTER FOR PREVENTIVE HEALTH EXAMINATION: Status: ACTIVE | Noted: 2020-01-23

## 2021-03-23 ENCOUNTER — TRANSCRIPTION ENCOUNTER (OUTPATIENT)
Age: 46
End: 2021-03-23

## 2021-08-28 ENCOUNTER — TRANSCRIPTION ENCOUNTER (OUTPATIENT)
Age: 46
End: 2021-08-28

## 2024-01-03 PROBLEM — Z78.9 OTHER SPECIFIED HEALTH STATUS: Chronic | Status: ACTIVE | Noted: 2020-01-06

## 2024-01-26 ENCOUNTER — APPOINTMENT (OUTPATIENT)
Dept: INTERNAL MEDICINE | Facility: CLINIC | Age: 49
End: 2024-01-26
Payer: COMMERCIAL

## 2024-01-26 ENCOUNTER — NON-APPOINTMENT (OUTPATIENT)
Age: 49
End: 2024-01-26

## 2024-01-26 VITALS — DIASTOLIC BLOOD PRESSURE: 78 MMHG | SYSTOLIC BLOOD PRESSURE: 120 MMHG

## 2024-01-26 VITALS
WEIGHT: 184 LBS | BODY MASS INDEX: 29.57 KG/M2 | OXYGEN SATURATION: 96 % | SYSTOLIC BLOOD PRESSURE: 120 MMHG | TEMPERATURE: 98 F | HEART RATE: 73 BPM | HEIGHT: 66 IN | DIASTOLIC BLOOD PRESSURE: 90 MMHG

## 2024-01-26 DIAGNOSIS — Z78.9 OTHER SPECIFIED HEALTH STATUS: ICD-10-CM

## 2024-01-26 DIAGNOSIS — I40.1 ISOLATED MYOCARDITIS: ICD-10-CM

## 2024-01-26 PROCEDURE — 99204 OFFICE O/P NEW MOD 45 MIN: CPT

## 2024-01-26 PROCEDURE — 93000 ELECTROCARDIOGRAM COMPLETE: CPT

## 2024-01-26 PROCEDURE — 36415 COLL VENOUS BLD VENIPUNCTURE: CPT

## 2024-01-26 NOTE — PHYSICAL EXAM
[No Acute Distress] : no acute distress [Well Nourished] : well nourished [Well Developed] : well developed [Well-Appearing] : well-appearing [Normal Voice/Communication] : normal voice/communication [Normal Sclera/Conjunctiva] : normal sclera/conjunctiva [PERRL] : pupils equal round and reactive to light [EOMI] : extraocular movements intact [Normal Outer Ear/Nose] : the outer ears and nose were normal in appearance [Normal TMs] : both tympanic membranes were normal [Normal Oropharynx] : the oropharynx was normal [No JVD] : no jugular venous distention [No Lymphadenopathy] : no lymphadenopathy [Supple] : supple [Thyroid Normal, No Nodules] : the thyroid was normal and there were no nodules present [No Respiratory Distress] : no respiratory distress  [No Accessory Muscle Use] : no accessory muscle use [Clear to Auscultation] : lungs were clear to auscultation bilaterally [Normal Rate] : normal rate  [Regular Rhythm] : with a regular rhythm [Normal S1, S2] : normal S1 and S2 [No Murmur] : no murmur heard [No Carotid Bruits] : no carotid bruits [No Abdominal Bruit] : a ~M bruit was not heard ~T in the abdomen [No Varicosities] : no varicosities [Pedal Pulses Present] : the pedal pulses are present [No Edema] : there was no peripheral edema [No Palpable Aorta] : no palpable aorta [No Extremity Clubbing/Cyanosis] : no extremity clubbing/cyanosis [Soft] : abdomen soft [Non Tender] : non-tender [Non-distended] : non-distended [No Masses] : no abdominal mass palpated [No HSM] : no HSM [Normal Bowel Sounds] : normal bowel sounds [Normal Supraclavicular Nodes] : no supraclavicular lymphadenopathy [Normal Posterior Cervical Nodes] : no posterior cervical lymphadenopathy [No CVA Tenderness] : no CVA  tenderness [Normal Anterior Cervical Nodes] : no anterior cervical lymphadenopathy [No Spinal Tenderness] : no spinal tenderness [No Joint Swelling] : no joint swelling [Grossly Normal Strength/Tone] : grossly normal strength/tone [No Rash] : no rash [Coordination Grossly Intact] : coordination grossly intact [No Focal Deficits] : no focal deficits [Normal Gait] : normal gait [Speech Grossly Normal] : speech grossly normal [Deep Tendon Reflexes (DTR)] : deep tendon reflexes were 2+ and symmetric [Memory Grossly Normal] : memory grossly normal [Normal Affect] : the affect was normal [Normal Mood] : the mood was normal [Alert and Oriented x3] : oriented to person, place, and time [Normal Insight/Judgement] : insight and judgment were intact

## 2024-01-29 LAB
ALBUMIN SERPL ELPH-MCNC: 4.4 G/DL
ALP BLD-CCNC: 69 U/L
ALT SERPL-CCNC: 55 U/L
ANION GAP SERPL CALC-SCNC: 12 MMOL/L
APPEARANCE: CLEAR
AST SERPL-CCNC: 32 U/L
BILIRUB SERPL-MCNC: 0.4 MG/DL
BILIRUBIN URINE: NEGATIVE
BLOOD URINE: NEGATIVE
BUN SERPL-MCNC: 15 MG/DL
CALCIUM SERPL-MCNC: 9.3 MG/DL
CHLORIDE SERPL-SCNC: 105 MMOL/L
CHOLEST SERPL-MCNC: 201 MG/DL
CO2 SERPL-SCNC: 22 MMOL/L
COLOR: YELLOW
CREAT SERPL-MCNC: 1.16 MG/DL
EGFR: 78 ML/MIN/1.73M2
ESTIMATED AVERAGE GLUCOSE: 103 MG/DL
GLUCOSE QUALITATIVE U: NEGATIVE MG/DL
GLUCOSE SERPL-MCNC: 96 MG/DL
HBA1C MFR BLD HPLC: 5.2 %
HCT VFR BLD CALC: 45 %
HDLC SERPL-MCNC: 30 MG/DL
HGB BLD-MCNC: 15.5 G/DL
KETONES URINE: NEGATIVE MG/DL
LDLC SERPL CALC-MCNC: 81 MG/DL
LEUKOCYTE ESTERASE URINE: NEGATIVE
MCHC RBC-ENTMCNC: 32.2 PG
MCHC RBC-ENTMCNC: 34.4 GM/DL
MCV RBC AUTO: 93.4 FL
NITRITE URINE: NEGATIVE
NONHDLC SERPL-MCNC: 170 MG/DL
PH URINE: 5.5
PLATELET # BLD AUTO: 205 K/UL
POTASSIUM SERPL-SCNC: 4.5 MMOL/L
PROT SERPL-MCNC: 6.8 G/DL
PROTEIN URINE: NEGATIVE MG/DL
PSA SERPL-MCNC: 0.28 NG/ML
RBC # BLD: 4.82 M/UL
RBC # FLD: 12.5 %
SODIUM SERPL-SCNC: 140 MMOL/L
SPECIFIC GRAVITY URINE: 1.02
T3 SERPL-MCNC: 122 NG/DL
T4 FREE SERPL-MCNC: 1.3 NG/DL
TRIGL SERPL-MCNC: 556 MG/DL
TSH SERPL-ACNC: 2.26 UIU/ML
URATE SERPL-MCNC: 7.1 MG/DL
UROBILINOGEN URINE: 0.2 MG/DL
WBC # FLD AUTO: 7.35 K/UL

## 2024-01-29 NOTE — PLAN
[FreeTextEntry1] : Continue Present Medications Further instructions pending lab results EKG shows sinus rhythm and non-specific ST-T wave changes

## 2024-01-29 NOTE — HISTORY OF PRESENT ILLNESS
[FreeTextEntry1] : Annual Physical  Pt is a new patient, establishing care. [de-identified] : JOE ALEXANDER is a 49-year M who presents today for Annual Physical. Pt is a new patient, establishing care. 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 Patient says that he was checked for blockages at the time and nothing was found, and notes that everything has cleared up since then. Patient has not visited a primary care physician in 5 years.

## 2024-01-29 NOTE — END OF VISIT
[FreeTextEntry3] :  "I, Mathew Booker, personally scribed the services dictated to me by Dr. Edwin Smallwood MD in this documentation on 01/26/2024 "   "I Dr. Edwin Smallwood MD, personally performed the services described in this documentation on 01/26/2024 for the patient as scribed by Mathew Booker in my presence. I have reviewed and verified that all the information is accurate and true."

## 2025-02-17 ENCOUNTER — OFFICE (OUTPATIENT)
Dept: URBAN - METROPOLITAN AREA CLINIC 100 | Facility: CLINIC | Age: 50
Setting detail: OPHTHALMOLOGY
End: 2025-02-17
Payer: COMMERCIAL

## 2025-02-17 DIAGNOSIS — T15.12XA: ICD-10-CM

## 2025-02-17 PROCEDURE — 99203 OFFICE O/P NEW LOW 30 MIN: CPT | Performed by: OPHTHALMOLOGY

## 2025-02-17 ASSESSMENT — CONFRONTATIONAL VISUAL FIELD TEST (CVF)
OS_FINDINGS: FULL
OD_FINDINGS: FULL

## 2025-02-17 ASSESSMENT — TONOMETRY
OD_IOP_MMHG: 14
OS_IOP_MMHG: 14

## 2025-02-17 ASSESSMENT — VISUAL ACUITY
OD_BCVA: 20/20
OS_BCVA: 20/20

## 2025-08-01 ENCOUNTER — APPOINTMENT (OUTPATIENT)
Dept: FAMILY MEDICINE | Facility: CLINIC | Age: 50
End: 2025-08-01
Payer: COMMERCIAL

## 2025-08-01 ENCOUNTER — NON-APPOINTMENT (OUTPATIENT)
Age: 50
End: 2025-08-01

## 2025-08-01 VITALS
DIASTOLIC BLOOD PRESSURE: 80 MMHG | SYSTOLIC BLOOD PRESSURE: 120 MMHG | HEART RATE: 81 BPM | OXYGEN SATURATION: 97 % | HEIGHT: 66 IN | WEIGHT: 187 LBS | BODY MASS INDEX: 30.05 KG/M2 | TEMPERATURE: 97.9 F

## 2025-08-01 DIAGNOSIS — R21 RASH AND OTHER NONSPECIFIC SKIN ERUPTION: ICD-10-CM

## 2025-08-01 DIAGNOSIS — Z12.5 ENCOUNTER FOR SCREENING FOR MALIGNANT NEOPLASM OF PROSTATE: ICD-10-CM

## 2025-08-01 DIAGNOSIS — Z13.220 ENCOUNTER FOR SCREENING FOR LIPOID DISORDERS: ICD-10-CM

## 2025-08-01 DIAGNOSIS — Z13.0 ENCOUNTER FOR SCREENING FOR DISEASES OF THE BLOOD AND BLOOD-FORMING ORGANS AND CERTAIN DISORDERS INVOLVING THE IMMUNE MECHANISM: ICD-10-CM

## 2025-08-01 DIAGNOSIS — R00.2 PALPITATIONS: ICD-10-CM

## 2025-08-01 DIAGNOSIS — Z13.29 ENCOUNTER FOR SCREENING FOR OTHER SUSPECTED ENDOCRINE DISORDER: ICD-10-CM

## 2025-08-01 DIAGNOSIS — Z13.1 ENCOUNTER FOR SCREENING FOR DIABETES MELLITUS: ICD-10-CM

## 2025-08-01 DIAGNOSIS — Z00.00 ENCOUNTER FOR GENERAL ADULT MEDICAL EXAMINATION W/OUT ABNORMAL FINDINGS: ICD-10-CM

## 2025-08-01 DIAGNOSIS — Z78.9 OTHER SPECIFIED HEALTH STATUS: ICD-10-CM

## 2025-08-01 DIAGNOSIS — Z86.19 PERSONAL HISTORY OF OTHER INFECTIOUS AND PARASITIC DISEASES: ICD-10-CM

## 2025-08-01 PROCEDURE — 99386 PREV VISIT NEW AGE 40-64: CPT | Mod: GC

## 2025-08-01 PROCEDURE — 93000 ELECTROCARDIOGRAM COMPLETE: CPT

## 2025-08-01 RX ORDER — ELECTROLYTES/DEXTROSE
SOLUTION, ORAL ORAL DAILY
Refills: 0 | Status: ACTIVE | COMMUNITY

## 2025-09-13 DIAGNOSIS — E78.1 PURE HYPERGLYCERIDEMIA: ICD-10-CM

## 2025-09-13 DIAGNOSIS — R09.81 NASAL CONGESTION: ICD-10-CM

## 2025-09-19 PROBLEM — R09.81 NASAL CONGESTION: Status: ACTIVE | Noted: 2025-09-19

## 2025-09-19 PROBLEM — E78.1 HIGH TRIGLYCERIDES: Status: ACTIVE | Noted: 2025-09-19

## 2025-09-19 RX ORDER — FLUTICASONE PROPIONATE 50 UG/1
50 SPRAY NASAL
Qty: 1 | Refills: 0 | Status: ACTIVE | COMMUNITY
Start: 2025-09-19 | End: 1900-01-01